# Patient Record
Sex: MALE | Race: WHITE | NOT HISPANIC OR LATINO | URBAN - METROPOLITAN AREA
[De-identification: names, ages, dates, MRNs, and addresses within clinical notes are randomized per-mention and may not be internally consistent; named-entity substitution may affect disease eponyms.]

---

## 2019-04-25 ENCOUNTER — HOSPITAL ENCOUNTER (OUTPATIENT)
Dept: RADIOLOGY | Facility: MEDICAL CENTER | Age: 64
End: 2019-04-25
Attending: INTERNAL MEDICINE
Payer: COMMERCIAL

## 2019-04-25 DIAGNOSIS — R13.19 ESOPHAGEAL DYSPHAGIA: ICD-10-CM

## 2019-04-25 DIAGNOSIS — R13.19 CONSTANT LOW-GRADE DYSPHAGIA: ICD-10-CM

## 2019-04-25 PROCEDURE — 92611 MOTION FLUOROSCOPY/SWALLOW: CPT

## 2019-04-25 PROCEDURE — 74230 X-RAY XM SWLNG FUNCJ C+: CPT

## 2019-04-25 NOTE — OP THERAPY EVALUATION
[facility]  Speech-Language Pathology Department  Modified Barium Swallow Study Summary    Patient: Kal Navarro     Date of Evaluation: 4/25/2019    Referring Provider:  Dr. Michell Wang; Fax: [phone]    Radiologist:  Dr. Duke    Patient History/Reason for Referral: Pt was referred for MBS due to 'constant, low-grade dysphagia' and 'esophageal dysphagia.' Pt with no PMHx on file. Pt with no SLP hx on file.    Pt arrived on time to today's appointment. Pt reported hx of globus sensation for 'years' with progressive difficulties. Pt reported recent esophageal dilation (Dec. 2018) due to esophageal stricture; however, he has been experiencing swallowing difficulties again (with advanced solid textures specifically). Pt denied recent hx of PNA, odynophagia and/or trouble swallowing pills and/or thin liquids. Pt reported that he occasionally makes himself vomit/regurgitate the food if he feels that it will not 'go down.' He reports that this usually happens when he does not chew his food up well enough. Pt endorsed intermittent acid reflux that he manages with OTC medications.    Oral Mechanism Exam:   -Dentition: Intact  -Labial: WFL  -Lingual: WFL  -Palatal Elevation: WFL  -Laryngeal Elevation: WFL  -Vocal Quality: Normal    Procedure Results:  The examination was conducted with videofluoroscopy from a   Lateral and Anterior view.  The following textures were presented:   Pudding, Cookie and Thin Liquid     Structural Abnormalities:  None observed    The following observations were made:   (WFL unless otherwise noted)    Oral Phase Thin Liquids  Puree Solid   Lip Closure      Tongue Control During Bolus Hold      Premature spillage into the valleculae      Premature spillage into the pyriform sinus      Bolus Preparation/ Mastication      Bolus Transport/ Lingual Motion      Oral Residue after swallow X  Trace, lingual     Initiation of Pharyngeal Swallow         Other Observations:             Pharyngeal Phase Thin Liquids  Puree Solid   Soft Palate Elevation      Laryngeal Elevation      Anterior Hyoid Excursion      Epiglottic Inversion       Laryngeal Vestibular Closure      Pharyngeal Stripping Wave      Pharyngoesophageal Segment Opening (PES)      Tongue Base Retraction (BOT) and/or BOT Residue      Residue in valleculae      Residue in piriform sinus(es) X  Trace   X  Trace    Residue on posterior pharyngeal wall (PPW)      Penetration X  Trace/flash     Aspiration        Other Observations:  - Pt was able to clear trace residue with secondary swallow      Penetration Aspiration Scale:   Score of 1: Neither penetration nor aspiration  Score of 2-5: Penetration  Score of 6-8: Aspiration    1: Material does not enter airway   2: Material enters airway, but remains above vocal folds; Ejected from airway; no stasis  3: Material remains above vocal folds; visible stasis remains  4: Material contacts vocal folds, but is ejected; no stasis  5: Material contacts vocal folds, and is not ejected; visible stasis remains  6: Material passes glottis, but is ejected from airway; No visible subglottic stasis  7: Material passes glottis, but is not ejected from airway; visible subglottic stasis despite patient’s response  8: Material passes glottis, and is not ejected; visible subglottic stasis; Absent patient response                          Esophageal Phase:  Mild retrograde flow below the PES, with eventual complete clearance                                Impressions:  Pt with no aspiration appreciated during today's exam. Trace/flash penetration noted during the swallow with large liquid bolus only. Oral dysphagia characterized by trace lingual residue after the swallow for thin liquids only. Pharyngeal dysphagia characterized by trace residue in piriform sinuses for thins and regular solids and trace/flash penetration noted during the swallow with large liquid bolus only. Esophageal swallow phase  characterized by mild retrograde flow below the PES, with eventual complete clearance.    Pt's symptoms appear to be primarily related to esophageal dysphagia. Pt will also benefit from slowing down while eating and ensuing that he chews his food well enough prior to swallowing.       Recommendations: Diet:     Regular       Liquid:     Thin                                        Medications:   Whole with liquid wash            Compensatory Strategies:   Upright 90 degrees, Small bites/sips, Alternate bites/sips, Slow feeding rate, Avoid problematic foods, Remain upright for >30-min after eating/drinking        Your patient may benefit from a referral for:     - Follow-up with GI re: esophageal dysphagia and further discussion re: Pt's interest for repeat dilation            Thank you for the referral.    For further questions, please call [phone].       Amber Vuong, SLP

## 2019-05-22 ENCOUNTER — HOSPITAL ENCOUNTER (OUTPATIENT)
Dept: HOSPITAL 8 - STAR | Age: 64
Discharge: HOME | End: 2019-05-22
Attending: ORTHOPAEDIC SURGERY
Payer: COMMERCIAL

## 2019-05-22 VITALS — DIASTOLIC BLOOD PRESSURE: 79 MMHG | SYSTOLIC BLOOD PRESSURE: 133 MMHG

## 2019-05-22 DIAGNOSIS — M17.12: ICD-10-CM

## 2019-05-22 DIAGNOSIS — Z01.818: Primary | ICD-10-CM

## 2019-05-22 PROCEDURE — 87081 CULTURE SCREEN ONLY: CPT

## 2019-05-22 PROCEDURE — 93005 ELECTROCARDIOGRAM TRACING: CPT

## 2019-05-29 ENCOUNTER — HOSPITAL ENCOUNTER (OUTPATIENT)
Dept: HOSPITAL 8 - OUT | Age: 64
Setting detail: OBSERVATION
LOS: 1 days | Discharge: HOME | End: 2019-05-30
Attending: ORTHOPAEDIC SURGERY | Admitting: ORTHOPAEDIC SURGERY
Payer: COMMERCIAL

## 2019-05-29 VITALS — BODY MASS INDEX: 30.2 KG/M2 | WEIGHT: 210.98 LBS | HEIGHT: 70 IN

## 2019-05-29 VITALS — SYSTOLIC BLOOD PRESSURE: 109 MMHG | DIASTOLIC BLOOD PRESSURE: 69 MMHG

## 2019-05-29 VITALS — SYSTOLIC BLOOD PRESSURE: 111 MMHG | DIASTOLIC BLOOD PRESSURE: 69 MMHG

## 2019-05-29 DIAGNOSIS — I10: ICD-10-CM

## 2019-05-29 DIAGNOSIS — M21.162: ICD-10-CM

## 2019-05-29 DIAGNOSIS — Z79.899: ICD-10-CM

## 2019-05-29 DIAGNOSIS — M17.12: Primary | ICD-10-CM

## 2019-05-29 DIAGNOSIS — K21.9: ICD-10-CM

## 2019-05-29 DIAGNOSIS — E11.9: ICD-10-CM

## 2019-05-29 PROCEDURE — 96372 THER/PROPH/DIAG INJ SC/IM: CPT

## 2019-05-29 PROCEDURE — 97161 PT EVAL LOW COMPLEX 20 MIN: CPT

## 2019-05-29 PROCEDURE — 96365 THER/PROPH/DIAG IV INF INIT: CPT

## 2019-05-29 PROCEDURE — 85018 HEMOGLOBIN: CPT

## 2019-05-29 PROCEDURE — G0378 HOSPITAL OBSERVATION PER HR: HCPCS

## 2019-05-29 PROCEDURE — 36415 COLL VENOUS BLD VENIPUNCTURE: CPT

## 2019-05-29 PROCEDURE — 96366 THER/PROPH/DIAG IV INF ADDON: CPT

## 2019-05-29 PROCEDURE — 97116 GAIT TRAINING THERAPY: CPT

## 2019-05-29 PROCEDURE — 73560 X-RAY EXAM OF KNEE 1 OR 2: CPT

## 2019-05-29 PROCEDURE — C1713 ANCHOR/SCREW BN/BN,TIS/BN: HCPCS

## 2019-05-29 PROCEDURE — 96376 TX/PRO/DX INJ SAME DRUG ADON: CPT

## 2019-05-29 PROCEDURE — 85014 HEMATOCRIT: CPT

## 2019-05-29 PROCEDURE — 82962 GLUCOSE BLOOD TEST: CPT

## 2019-05-29 PROCEDURE — 97150 GROUP THERAPEUTIC PROCEDURES: CPT

## 2019-05-29 PROCEDURE — 96375 TX/PRO/DX INJ NEW DRUG ADDON: CPT

## 2019-05-29 PROCEDURE — C1776 JOINT DEVICE (IMPLANTABLE): HCPCS

## 2019-05-29 PROCEDURE — 27447 TOTAL KNEE ARTHROPLASTY: CPT

## 2019-05-29 RX ADMIN — CEFAZOLIN SODIUM SCH MLS/HR: 1 SOLUTION INTRAVENOUS at 17:41

## 2019-05-29 RX ADMIN — ACETAMINOPHEN SCH MG: 500 TABLET, COATED ORAL at 17:02

## 2019-05-29 RX ADMIN — INSULIN HUMAN SCH UNITS: 100 INJECTION, SOLUTION PARENTERAL at 11:00

## 2019-05-29 RX ADMIN — SODIUM CHLORIDE, SODIUM LACTATE, POTASSIUM CHLORIDE, AND CALCIUM CHLORIDE SCH MLS/HR: .6; .31; .03; .02 INJECTION, SOLUTION INTRAVENOUS at 17:40

## 2019-05-29 RX ADMIN — FENTANYL CITRATE PRN MCG: 50 INJECTION INTRAMUSCULAR; INTRAVENOUS at 11:46

## 2019-05-29 RX ADMIN — ACETAMINOPHEN SCH MG: 500 TABLET, COATED ORAL at 23:55

## 2019-05-29 RX ADMIN — PANTOPRAZOLE SODIUM SCH MG: 40 TABLET, DELAYED RELEASE ORAL at 20:32

## 2019-05-29 RX ADMIN — KETOROLAC TROMETHAMINE SCH MG: 30 INJECTION, SOLUTION INTRAMUSCULAR at 15:00

## 2019-05-29 RX ADMIN — FERROUS SULFATE TAB 325 MG (65 MG ELEMENTAL FE) SCH MG: 325 (65 FE) TAB at 17:02

## 2019-05-29 RX ADMIN — DOCUSATE SODIUM SCH MG: 100 CAPSULE, LIQUID FILLED ORAL at 20:32

## 2019-05-29 RX ADMIN — ASPIRIN SCH MG: 81 TABLET, COATED ORAL at 20:32

## 2019-05-29 RX ADMIN — SODIUM CHLORIDE, SODIUM LACTATE, POTASSIUM CHLORIDE, AND CALCIUM CHLORIDE SCH MLS/HR: .6; .31; .03; .02 INJECTION, SOLUTION INTRAVENOUS at 07:47

## 2019-05-29 RX ADMIN — FENTANYL CITRATE PRN MCG: 50 INJECTION INTRAMUSCULAR; INTRAVENOUS at 11:25

## 2019-05-29 RX ADMIN — KETOROLAC TROMETHAMINE SCH MG: 30 INJECTION, SOLUTION INTRAMUSCULAR at 23:54

## 2019-05-29 RX ADMIN — SODIUM CHLORIDE, SODIUM LACTATE, POTASSIUM CHLORIDE, AND CALCIUM CHLORIDE SCH MLS/HR: .6; .31; .03; .02 INJECTION, SOLUTION INTRAVENOUS at 13:17

## 2019-05-29 RX ADMIN — INSULIN HUMAN SCH UNITS: 100 INJECTION, SOLUTION PARENTERAL at 20:31

## 2019-05-29 RX ADMIN — CALCIUM CARBONATE-CHOLECALCIFEROL TAB 250 MG-125 UNIT SCH TAB: 250-125 TAB at 17:02

## 2019-05-29 RX ADMIN — CALCIUM CARBONATE-CHOLECALCIFEROL TAB 250 MG-125 UNIT SCH TAB: 250-125 TAB at 12:00

## 2019-05-29 RX ADMIN — INSULIN HUMAN SCH UNITS: 100 INJECTION, SOLUTION PARENTERAL at 17:01

## 2019-05-30 VITALS — SYSTOLIC BLOOD PRESSURE: 108 MMHG | DIASTOLIC BLOOD PRESSURE: 63 MMHG

## 2019-05-30 VITALS — SYSTOLIC BLOOD PRESSURE: 128 MMHG | DIASTOLIC BLOOD PRESSURE: 76 MMHG

## 2019-05-30 VITALS — SYSTOLIC BLOOD PRESSURE: 111 MMHG | DIASTOLIC BLOOD PRESSURE: 71 MMHG

## 2019-05-30 VITALS — SYSTOLIC BLOOD PRESSURE: 116 MMHG | DIASTOLIC BLOOD PRESSURE: 66 MMHG

## 2019-05-30 RX ADMIN — INSULIN HUMAN SCH UNITS: 100 INJECTION, SOLUTION PARENTERAL at 06:36

## 2019-05-30 RX ADMIN — CEFAZOLIN SODIUM SCH MLS/HR: 1 SOLUTION INTRAVENOUS at 01:30

## 2019-05-30 RX ADMIN — FERROUS SULFATE TAB 325 MG (65 MG ELEMENTAL FE) SCH MG: 325 (65 FE) TAB at 08:08

## 2019-05-30 RX ADMIN — KETOROLAC TROMETHAMINE SCH MG: 30 INJECTION, SOLUTION INTRAMUSCULAR at 08:05

## 2019-05-30 RX ADMIN — SODIUM CHLORIDE, SODIUM LACTATE, POTASSIUM CHLORIDE, AND CALCIUM CHLORIDE SCH MLS/HR: .6; .31; .03; .02 INJECTION, SOLUTION INTRAVENOUS at 00:00

## 2019-05-30 RX ADMIN — PANTOPRAZOLE SODIUM SCH MG: 40 TABLET, DELAYED RELEASE ORAL at 08:09

## 2019-05-30 RX ADMIN — DOCUSATE SODIUM SCH MG: 100 CAPSULE, LIQUID FILLED ORAL at 08:07

## 2019-05-30 RX ADMIN — ASPIRIN SCH MG: 81 TABLET, COATED ORAL at 08:08

## 2019-05-30 RX ADMIN — ACETAMINOPHEN SCH MG: 500 TABLET, COATED ORAL at 10:14

## 2019-05-30 RX ADMIN — ACETAMINOPHEN SCH MG: 500 TABLET, COATED ORAL at 06:35

## 2019-05-30 RX ADMIN — SODIUM CHLORIDE, SODIUM LACTATE, POTASSIUM CHLORIDE, AND CALCIUM CHLORIDE SCH MLS/HR: .6; .31; .03; .02 INJECTION, SOLUTION INTRAVENOUS at 07:24

## 2019-05-30 RX ADMIN — CALCIUM CARBONATE-CHOLECALCIFEROL TAB 250 MG-125 UNIT SCH TAB: 250-125 TAB at 08:07

## 2021-11-30 PROBLEM — M25.512 LEFT SHOULDER PAIN: Status: ACTIVE | Noted: 2021-11-30
